# Patient Record
Sex: MALE | NOT HISPANIC OR LATINO | Employment: FULL TIME | URBAN - METROPOLITAN AREA
[De-identification: names, ages, dates, MRNs, and addresses within clinical notes are randomized per-mention and may not be internally consistent; named-entity substitution may affect disease eponyms.]

---

## 2017-10-27 ENCOUNTER — HOSPITAL ENCOUNTER (OUTPATIENT)
Dept: OCCUPATIONAL THERAPY | Facility: CLINIC | Age: 62
Setting detail: THERAPIES SERIES
End: 2017-10-27
Payer: COMMERCIAL

## 2017-10-27 PROCEDURE — 97140 MANUAL THERAPY 1/> REGIONS: CPT | Mod: GO | Performed by: OCCUPATIONAL THERAPIST

## 2017-10-27 PROCEDURE — 40000445 ZZHC STATISTIC OT VISIT, LYMPHEDEMA: Performed by: OCCUPATIONAL THERAPIST

## 2017-10-27 PROCEDURE — 97165 OT EVAL LOW COMPLEX 30 MIN: CPT | Mod: GO | Performed by: OCCUPATIONAL THERAPIST

## 2017-10-27 PROCEDURE — 97535 SELF CARE MNGMENT TRAINING: CPT | Mod: GO | Performed by: OCCUPATIONAL THERAPIST

## 2017-10-27 NOTE — PROGRESS NOTES
10/27/17 0800   Rehab Discipline   Discipline OT   Type of Visit   Type of visit Initial Edema Evaluation   General Information   Start of care 10/27/17   Referring physician Lizet Castellano CNP   Orders Evaluate and treat as indicated   Order date 10/26/17   Medical diagnosis H/o left knee cellulitis, left ankle injury and recent cellulitis of lower, left leg 9/3/17. Pt presents with continued swelling of LLE after treatment with antibiotics.   Onset of illness / date of surgery 09/03/17   Edema onset 09/03/17   Affected body parts LLE   Edema etiology Infection   Pertinent history of current problem (PT: include personal factors and/or comorbidities that impact the POC; OT: include additional occupational profile info) Pt has had previous, bilateral ankle injuries requiring wear of AFOs, h/o cellulitis in left knee and now cellulitis below left knee.  Pt undewent IV antibiotics and oral antibiotics iwth edema still present.   Surgical / medical history reviewed Yes   Prior level of functional mobility modifed IND with SEC   Prior treatment Elevation;ACE wraps   Community support Family / friend caregiver   Patient role / employment history Employed  (works at [email or web address])   Living environment House / [facility]   Living environment comments no concerns   Current assistive devices Standard cane   System Outcome Measures   Outcome Measures Lymphedema   Lymphedema Life Impact Scale (score range 0-72). A higher score indicates greater impairment. 19   Subjective Report   Patient report of symptoms tightness and swelling in ankle and foot on left leg   Patient / Family Goals   Patient / family goals statement to reduce edema and walk better, return to work   Pain   Patient currently in pain No   Cognitive Status   Orientation Orientation to person, place and time   Level of consciousness Alert   Follows commands and answers questions 100% of the time   Edema Exam / Assessment   Skin condition  comments RLE with 1+ pitting at MTP joints, trace edeam from arch of foot to knee crease, no edema of thigh.  LLE with 2+ pitting of foot and ankle, 1+ pitting just above ankle joint, trace edema from above ankle to knee cease, no edema of thighs. Bilateral feet with very high arches, pulling all toes into extension   Scar No   Stemmer sign Negative   Ulceration No   Girth Measurements   Girth Measurements Refer to separate girth measurement flowsheet   Range of Motion   ROM comments decreased ROM of left ankle: reduced ankle flexion/extension and circumduction   Posture   Posture Normal   Activities of Daily Living   Activities of Daily Living IND   Bed Mobility   Bed mobility IND   Transfers   Transfers IND   Gait / Locomotion   Gait / Locomotion modifed IND iwth SEC, limp noted   Sensory   Sensory perception comments intact sensation   Planned Edema Interventions   Planned edema interventions Manual lymph drainage;Gradient compression bandaging;Fit for compression garment;Exercises;Precautions to prevent infection / exacerbation;Education;Manual therapy;ADL training;Skin care / precautions;Home management program development   Clinical Impression   Criteria for skilled therapeutic intervention met Yes   Therapy diagnosis LLE edema/lymphedema   Assessment of Occupational Performance 1-3 Performance Deficits   Identified Performance Deficits inability to wear AFO on LLE, decreased ambulation   Clinical Decision Making (Complexity) Low complexity   Treatment frequency (2x/wk x2wks, 1x/wk x2wks)   Treatment duration [id number]; 12/1/17   Patient / family and/or staff in agreement with plan of care Yes   Risks and benefits of therapy have been explained Yes   Clinical impression comments Pt presents with edema in LLE after cellulitis which has caused inability to wear AFOs on LLE and decreased standing tolerance.  Pt will benefit from continued skilled OT intervention to address LLE edema.     Goals   Edema Eval Goals  1;2;3;4   Goal 1   Goal identifier home program   Goal description Pt will demonstrate IND with home program including: GCB to LLE , self MLD and HEP to reduce edema to improve skin integrity, prevent infection and to be fit for compression stocking for edema management at discharge.   Target date 12/01/17   Goal 2   Goal identifier volume   Goal description Pt will demonstrate a 200mL reduction in volume of LLE to be fit for compression stockings, fit into AFOs and improve standing tolerance needed for return to work.     Target date 12/01/17   Goal 3   Goal identifier AFO   Goal description Due to a reduction in edema of LLE pt will be able to wear AFO on LLE.   Target date 12/01/17   Goal 4   Goal identifier discharge   Goal description Pt will be IND with donning compression stocking and verbalizing wear/wash/repalce schedule for edema management at discharge and prevent recurrent cellulitis.    Target date 12/01/17   Total Evaluation Time   Total evaluation time 20

## 2017-11-01 ENCOUNTER — HOSPITAL ENCOUNTER (OUTPATIENT)
Dept: OCCUPATIONAL THERAPY | Facility: CLINIC | Age: 62
Setting detail: THERAPIES SERIES
End: 2017-11-01
Payer: COMMERCIAL

## 2017-11-01 PROCEDURE — 40000445 ZZHC STATISTIC OT VISIT, LYMPHEDEMA: Performed by: OCCUPATIONAL THERAPIST

## 2017-11-01 PROCEDURE — 97140 MANUAL THERAPY 1/> REGIONS: CPT | Mod: GO | Performed by: OCCUPATIONAL THERAPIST

## 2017-11-03 ENCOUNTER — HOSPITAL ENCOUNTER (OUTPATIENT)
Dept: OCCUPATIONAL THERAPY | Facility: CLINIC | Age: 62
Setting detail: THERAPIES SERIES
End: 2017-11-03
Payer: COMMERCIAL

## 2017-11-03 PROCEDURE — 97140 MANUAL THERAPY 1/> REGIONS: CPT | Mod: GO | Performed by: OCCUPATIONAL THERAPIST

## 2017-11-03 PROCEDURE — 40000445 ZZHC STATISTIC OT VISIT, LYMPHEDEMA: Performed by: OCCUPATIONAL THERAPIST

## 2017-11-06 ENCOUNTER — HOSPITAL ENCOUNTER (OUTPATIENT)
Dept: OCCUPATIONAL THERAPY | Facility: CLINIC | Age: 62
Setting detail: THERAPIES SERIES
End: 2017-11-06
Payer: COMMERCIAL

## 2017-11-06 PROCEDURE — 40000445 ZZHC STATISTIC OT VISIT, LYMPHEDEMA: Performed by: OCCUPATIONAL THERAPIST

## 2017-11-06 PROCEDURE — 97140 MANUAL THERAPY 1/> REGIONS: CPT | Mod: GO | Performed by: OCCUPATIONAL THERAPIST

## 2017-11-08 ENCOUNTER — HOSPITAL ENCOUNTER (OUTPATIENT)
Dept: OCCUPATIONAL THERAPY | Facility: CLINIC | Age: 62
Setting detail: THERAPIES SERIES
End: 2017-11-08
Payer: COMMERCIAL

## 2017-11-08 PROCEDURE — 40000445 ZZHC STATISTIC OT VISIT, LYMPHEDEMA: Performed by: OCCUPATIONAL THERAPIST

## 2017-11-08 PROCEDURE — 97140 MANUAL THERAPY 1/> REGIONS: CPT | Mod: GO | Performed by: OCCUPATIONAL THERAPIST

## 2017-11-13 ENCOUNTER — HOSPITAL ENCOUNTER (OUTPATIENT)
Dept: OCCUPATIONAL THERAPY | Facility: CLINIC | Age: 62
Setting detail: THERAPIES SERIES
End: 2017-11-13
Payer: COMMERCIAL

## 2017-11-13 PROCEDURE — 40000445 ZZHC STATISTIC OT VISIT, LYMPHEDEMA: Performed by: OCCUPATIONAL THERAPIST

## 2017-11-13 PROCEDURE — 97535 SELF CARE MNGMENT TRAINING: CPT | Mod: GO | Performed by: OCCUPATIONAL THERAPIST

## 2017-11-13 PROCEDURE — 97140 MANUAL THERAPY 1/> REGIONS: CPT | Mod: GO | Performed by: OCCUPATIONAL THERAPIST

## 2017-11-13 NOTE — PROGRESS NOTES
Outpatient Occupational Therapy Discharge Note     Patient: Clarence Raymond  : 1955  Insurance:   Payor/Plan Subscriber Name Rel Member # Group #   BCBS - [employer] [place]  [id number]       [id number]       Beginning/End Dates of Reporting Period:  10/27/17 to 2017    Referring Provider: FRENCH Groves Diagnosis: LLE edema    Client Self Report:       Objective Measurements:     Objective Measure: skin assessment   Details: Trace edema of left leg from digits to knee crease, palpable lateral malleolus and visible tendons and ligaments. Dry and flaking skin on anterior shin with two areas of unroofed skin, no bleeding or oozing; size is eraser-sized.   Objective Measure: volume   Details: no significant change in LLE volume as compared to 17   Objective Measure: LLIS   Details: 1-a 18 point reduction          Outcome Measures (most recent score):  Lymphedema Life Impact Scale (score range 0-72). A higher score indicates greater impairment.: 1 (an 18 point reduction )      Goals:   Goal Identifier home program   Goal Description Pt will demonstrate IND with home program including: GCB to LLE , self MLD and HEP to reduce edema to improve skin integrity, prevent infection and to be fit for compression stocking for edema management at discharge.   Target Date 17   Date Met  17   Progress:     Goal Identifier volume   Goal Description Pt will demonstrate a 200mL reduction in volume of LLE to be fit for compression stockings, fit into AFOs and improve standing tolerance needed for return to work.     Target Date 17   Date Met  17   Progress:     Goal Identifier AFO   Goal Description Due to a reduction in edema of LLE pt will be able to wear AFO on LLE.   Target Date 17   Date Met  17   Progress:     Goal Identifier discharge   Goal Description Pt will be IND with donning compression stocking and verbalizing  wear/wash/repalce schedule for edema management at discharge and prevent recurrent cellulitis.    Target Date 12/01/17   Date Met  11/13/17   Progress:     Goal Identifier     Goal Description     Target Date     Date Met      Progress:       Progress Toward Goals:   All Goals MET        Plan:  Discharge from therapy.    Discharge:    Reason for Discharge: Patient has met all goals.    Equipment Issued: GCB materials    Discharge Plan: Patient to continue home program: day wear of knee high, 20-30mmHG compression stockings

## 2023-02-15 ENCOUNTER — TRANSFERRED RECORDS (OUTPATIENT)
Dept: HEALTH INFORMATION MANAGEMENT | Facility: CLINIC | Age: 68
End: 2023-02-15

## 2023-04-17 ENCOUNTER — CARE COORDINATION (OUTPATIENT)
Dept: CARDIOLOGY | Facility: CLINIC | Age: 68
End: 2023-04-17

## 2023-04-20 ENCOUNTER — CARE COORDINATION (OUTPATIENT)
Dept: CARDIOLOGY | Facility: CLINIC | Age: 68
End: 2023-04-20
Payer: COMMERCIAL

## 2023-04-20 NOTE — PROGRESS NOTES
Pt is referred to Dr. Abrams for possible cardiac sarcoid from Dr. Colleen Bernal/[facility].    Message sent to Dr. Abrams for recommendation for PET and order.    4/26/23 - Message sent to request images from [facility]   normal...

## 2023-04-20 NOTE — PROGRESS NOTES
New Heart Failure Referral     Situation:     Referred via fax from Dr Bernal     Background     Sarcoid - Referred to Dr. Abrams     Assessment/Recommendation:      Sending to Heart Failure RN to Review.    Plan:     April 20, 2023 - staff message sent to Dr. Abrams regarding PET and where to schedule. To be within the 2 weeks need to be scheduled on or before 4/28    Per Dr. Abrams patient getting PET on 5/10 and has appt with Dr. Abrams on 5/30 at 1200.     Patient contacted and appointment made with Dr. Abrams on 5/30     Images requested 4/27 - MRI - 4/11/23, Echo - 3/22/23, Coronary angio - 3/31/23, Nuclear stress 3/9/23

## 2023-04-21 DIAGNOSIS — D86.85 CARDIAC SARCOIDOSIS: Primary | ICD-10-CM

## 2023-04-21 NOTE — PROGRESS NOTES
Date: 4/21/2023    Time of Call: 9:39 AM     Diagnosis: cardiac sarcoid      [ VORB ] Ordering provider: Dr JANIE Abrams    Order: FDG PET for cardiac sarcoid      Order received by: Mahnaz Ragland RN       Follow-up/additional notes:

## 2023-04-26 PROBLEM — G47.33 OSA (OBSTRUCTIVE SLEEP APNEA): Status: ACTIVE | Noted: 2017-09-19

## 2023-04-26 PROBLEM — I10 PRIMARY HYPERTENSION: Status: ACTIVE | Noted: 2022-11-02

## 2023-04-26 PROBLEM — I25.119 CORONARY ARTERY DISEASE INVOLVING NATIVE CORONARY ARTERY OF NATIVE HEART WITH ANGINA PECTORIS (H): Status: ACTIVE | Noted: 2023-03-13

## 2023-04-26 PROBLEM — I49.3 PVC'S (PREMATURE VENTRICULAR CONTRACTIONS): Status: ACTIVE | Noted: 2023-03-13

## 2023-04-26 PROBLEM — R00.1 SYMPTOMATIC BRADYCARDIA: Status: ACTIVE | Noted: 2023-04-10

## 2023-04-26 RX ORDER — ATORVASTATIN CALCIUM 40 MG/1
1 TABLET, FILM COATED ORAL DAILY
COMMUNITY
Start: 2023-02-15

## 2023-04-26 RX ORDER — MEXILETINE HYDROCHLORIDE 150 MG/1
150 CAPSULE ORAL 2 TIMES DAILY
COMMUNITY
Start: 2023-04-12

## 2023-04-26 RX ORDER — AMLODIPINE BESYLATE 5 MG/1
TABLET ORAL
COMMUNITY
Start: 2023-01-27 | End: 2023-06-06

## 2023-04-28 NOTE — TELEPHONE ENCOUNTER
RECORDS RECEIVED FROM:    DATE RECEIVED:    NOTES STATUS DETAILS   OFFICE NOTE from referring provider  Care Everywhere 23 NM MALICK Espinoza   OFFICE NOTE from other cardiologists  Care Everywhere 23 NM MALICK Espinoza   RECORDS from hospital/ED Care Everywhere a4-10-23   MEDICATION LIST Internal    GENERAL CARDIO RECORDS   (ALL APPOINTMENT TYPES)     LABS (CBC,BMP,CMP, TSH) Care Everywhere    EKG (STRIPS & REPORTS) Care Everywhere 23 viewable in Epic    MONITORS (STRIPS & REPORTS) In process 3-9-23   STRESS TESTS (IMAGES AND REPORTS) In process    NEW SARCOID      PET SCAN (LAST 5 YEARS)  (IMAGES AND REPORTS) Internal Scheduled 5-10-23   cMRI (LAST 5 YEARS)  (IMAGES AND REPORTS) In process    ECHOS (LAST 5 YEARS)  (IMAGES AND REPORTS) In process    CTA/ CT CHEST (LAST 5 YEARS)  (IMAGES AND REPORTS) N/A      Action 23 [facility] Imaging  Fax #[phone]   Action Taken Requested:    cMRI    Cardiac cath    Echo    Nuclear stress test   4-11-23    3-31-23    3-22-23    3-9-23     Trackin    Confirmed all images are in PACS       Action 23 NM  Fax #[phone]   Action Taken Requested suhaoparosemary 3-9-23    Sent to scanning

## 2023-05-02 NOTE — TELEPHONE ENCOUNTER
Records received    May 2, 2023 12:11 PM  [id number]   Facility  [facility]    Outcome Received disc, uploaded to PACS:   3/22/23 ECHO  3/9/23 NM STRESS TEST  3/31/23 Coronary Angiogram

## 2023-05-10 ENCOUNTER — ANCILLARY PROCEDURE (OUTPATIENT)
Dept: PET IMAGING | Facility: CLINIC | Age: 68
End: 2023-05-10
Attending: INTERNAL MEDICINE
Payer: COMMERCIAL

## 2023-05-10 VITALS — WEIGHT: 170 LBS

## 2023-05-10 DIAGNOSIS — D86.85 CARDIAC SARCOIDOSIS: ICD-10-CM

## 2023-05-10 LAB — GLUCOSE SERPL-MCNC: 73 MG/DL (ref 70–99)

## 2023-05-30 ENCOUNTER — PRE VISIT (OUTPATIENT)
Dept: CARDIOLOGY | Facility: CLINIC | Age: 68
End: 2023-05-30
Payer: COMMERCIAL

## 2023-05-30 ENCOUNTER — TELEPHONE (OUTPATIENT)
Dept: CARDIOLOGY | Facility: CLINIC | Age: 68
End: 2023-05-30
Payer: COMMERCIAL

## 2023-05-30 NOTE — TELEPHONE ENCOUNTER
Called pt and cancelled appt with Dr. Abrams on 5/30/23 and let pt know we will call back to reschedule.

## 2023-06-06 ENCOUNTER — OFFICE VISIT (OUTPATIENT)
Dept: CARDIOLOGY | Facility: CLINIC | Age: 68
End: 2023-06-06
Attending: INTERNAL MEDICINE
Payer: COMMERCIAL

## 2023-06-06 ENCOUNTER — TELEPHONE (OUTPATIENT)
Dept: CARDIOLOGY | Facility: CLINIC | Age: 68
End: 2023-06-06
Payer: COMMERCIAL

## 2023-06-06 ENCOUNTER — MYC MEDICAL ADVICE (OUTPATIENT)
Dept: CARDIOLOGY | Facility: CLINIC | Age: 68
End: 2023-06-06

## 2023-06-06 VITALS
WEIGHT: 175.9 LBS | DIASTOLIC BLOOD PRESSURE: 75 MMHG | SYSTOLIC BLOOD PRESSURE: 149 MMHG | OXYGEN SATURATION: 96 % | BODY MASS INDEX: 26.05 KG/M2 | HEART RATE: 67 BPM | HEIGHT: 69 IN

## 2023-06-06 DIAGNOSIS — I10 BENIGN ESSENTIAL HYPERTENSION: ICD-10-CM

## 2023-06-06 DIAGNOSIS — I47.29 NSVT (NONSUSTAINED VENTRICULAR TACHYCARDIA) (H): ICD-10-CM

## 2023-06-06 DIAGNOSIS — I49.3 PVC'S (PREMATURE VENTRICULAR CONTRACTIONS): ICD-10-CM

## 2023-06-06 DIAGNOSIS — I42.8 NON-ISCHEMIC CARDIOMYOPATHY (H): Primary | ICD-10-CM

## 2023-06-06 DIAGNOSIS — D86.85 CARDIAC SARCOIDOSIS: ICD-10-CM

## 2023-06-06 DIAGNOSIS — R94.39 ABNORMAL RESULT OF OTHER CARDIOVASCULAR FUNCTION STUDY: ICD-10-CM

## 2023-06-06 DIAGNOSIS — I50.22 CHRONIC SYSTOLIC HEART FAILURE (H): ICD-10-CM

## 2023-06-06 PROCEDURE — 99417 PROLNG OP E/M EACH 15 MIN: CPT | Performed by: INTERNAL MEDICINE

## 2023-06-06 PROCEDURE — 99213 OFFICE O/P EST LOW 20 MIN: CPT | Performed by: INTERNAL MEDICINE

## 2023-06-06 PROCEDURE — 99205 OFFICE O/P NEW HI 60 MIN: CPT | Performed by: INTERNAL MEDICINE

## 2023-06-06 RX ORDER — LOSARTAN POTASSIUM 50 MG/1
1 TABLET ORAL DAILY
COMMUNITY
Start: 2023-06-01 | End: 2023-06-06

## 2023-06-06 RX ORDER — SACUBITRIL AND VALSARTAN 49; 51 MG/1; MG/1
1 TABLET, FILM COATED ORAL 2 TIMES DAILY
Qty: 60 TABLET | Refills: 1 | Status: SHIPPED | OUTPATIENT
Start: 2023-06-06

## 2023-06-06 ASSESSMENT — PAIN SCALES - GENERAL: PAINLEVEL: NO PAIN (0)

## 2023-06-06 NOTE — NURSING NOTE
Chief Complaint   Patient presents with     New Patient     New Sarcoid- New Sarcoid referral from [facility] for possible cardiac Sarcoidosis, PET done 5/10 without evidence of cardiac or systemic sarcoid     Vitals were taken and medications reconciled.    Vahid Storm, EMT  10:53 AM

## 2023-06-06 NOTE — LETTER
6/6/2023      RE: Clarence Raymond  [address] [place] [address]       Dear Colleague,    Thank you for the opportunity to participate in the care of your patient, Clarence Raymond, at the [facility] [place] [facility]. Please see a copy of my visit note below.    Chief complaint: Possible cardiac sarcoidosis    HPI:   Clarence Raymond is a 67 year old male with history of HTN, HL, PAD without claudication, NURIS not on NIPPV, frequent PVCs/NSVT (24% PVC  [place] on ZioPatch 2/2023) and NICM of unclear etiology (LVEF=47% on CMR 4/10/23) referred for evalution of possible cardiac sarcoidosis.     He has been followed in the [facility] system, initially by Dr. Nahed Penny and more recently by Dr. Colleen Bernal. He initially saw Dr. Penny 2/15/23 after an ECG at a PCP visit showed frequent PVCs in a pattern of bigeminy. He underwent coronary ZioPatch, which showed frequent PVCs with 24% burden and 438 ventricular runs (duration not documented.) Coronary angiogram was done and showed no epicardial CAD. He was started on Toprol XL 25 mg daily for PVCs.     He subsequently presented to [facility] ED 4/10/23 with worsening of chronic progressive fatigue and exertional dyspnea, which had been slowly progressing over the prior 1-2 years but which ahd worsened more significantly over the preceding 5-6 months (since 11/2022-12/2022.)  He was noted to be bradycardic in the ED (sinus bradycardia with frequent  PVCs, VR 40s-60s.) He was seen by Dr. Bernal in inpatient consultation during this admission. CMR was done and read as showing LVEF=46% with hypertrabeculation and lateral wall LGE; differential diagnosis of noncompaction cardiomyopathy, myocarditis, and cardiac sarcoidosis was given. Beta blocker was stopped, mexiletine started, LifeVest placed, and he was referred for further evaluation of possible cardiac  sarcoidosis.     His wife reports that preceding his admission he has had several-year history of unpredictable episodes of feeling tired, weak, and short of breath. These episodes can occur at rest, but dyspnea occurs only with exertion. No associated palpitations, lightheadedness, or syncope.     He was recently transitioned from amlodipine to losartan 50 mg daily.     He is currently scheduled for a home sleep study in order to get treatment for his NURIS.    He was subsequently seen by EP in follow up on 4/19/23, at which time he reported feeling better. Referral to [facility] for genetic counseling was contemplated at that time but deferred given imminent plan for PET and referral To [facility].     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     He denies any chest pain, dyspnea at rest or with exertion, PND, orthopnea, peripheral edema, palpitations, lightheadedness or syncope.     Sarcoid history:  Initial presentation:  PVCs, fatigue/exertional dyspnea  Organs involved: ?heart  Cardiac sarcoidosis? Possible/TBD  Tissue diagnosis:  no  Device:   no  Arrhythmias:   PVCs, NSVT, atrial flutter  CMR:    4/10/23 ([facility])  PET:    5/10/23  LVEF:    ~45% CMR 4/10/23  Immunosuppresion:  none      PAST MEDICAL HISTORY:  Cardiomyopathy per HPI  Frequent PVCs/NSVT per HPI  HTN  HL  PAD without claudication  NURIS  H/o foot ulcers related to foot/ankle brace for foot deformity, several of which have led to sepsis  Congential foot deformity      CURRENT MEDICATIONS:  Current Outpatient Medications   Medication Sig Dispense Refill    aspirin (ASA) 81 MG EC tablet Take 81 mg by mouth daily      atorvastatin (LIPITOR) 40 MG tablet Take 1 tablet by mouth daily      losartan (COZAAR) 50 MG tablet Take 1 tablet by mouth daily      mexiletine (MEXITIL) 150 MG capsule Take 150 mg by mouth 2 times daily      amLODIPine (NORVASC) 5 MG tablet TAKE 1 TABLET(5 MG) BY MOUTH EVERY  "DAY         ALLERGIES:   No Known Allergies    FAMILY HISTORY:  Father with DM  Mother with DM  Sister with COPD and PE  No known family history of cardiomyopathy or sarcoidosis.   No known family history of premature CAD, arrhythmias, or sudden death.    SOCIAL HISTORY:       ROS:   A comprehensive 14 point review of systems is negative other than as mentioned in HPI.    Exam:  BP (!) 149/75 (BP Location: Right arm, Patient Position: Chair, Cuff Size: Adult Regular)   Pulse 67   Ht 1.75 m (5' 8.9\")   Wt 79.8 kg (175 lb 14.4 oz)   SpO2 96%   BMI 26.05 kg/m    GENERAL APPEARANCE: healthy, alert and no distress  EYES: no icterus, no xanthelasmas  ENT: normal palate, mucosa moist, no central cyanosis  NECK: JVP not elevated  RESPIRATORY: lungs clear to auscultation - no rales, rhonchi or wheezes, no use of accessory muscles, no retractions, respirations are unlabored, normal respiratory rate  CARDIOVASCULAR: regular rhythm, normal S1 with physiologic split S2, no S3 or S4 and no murmur, click or rub.  GI: soft, non tender, bowel sounds normal,no abdominal bruits  EXTREMITIES: no edema, no bruits  NEURO: alert and oriented to person/place/time, normal speech, gait and affect  VASC: Radial, dorsalis pedis and posterior tibialis pulses 2+ bilaterally.  SKIN: no ecchymoses, no rashes.  PSYCH: cooperative, affect appropriate.     Labs:  Reviewed.       Testing/Procedures:  I personally visualized and interpreted:  FDG/NH3 PET 6/6/23:  No abnormal FDG uptake to suggest cardiac or extracardiac sarcoidosis.   Lateral wall/septal/apical perfusion defect in a non-coronary distribution, likely related to non-ischemic cardiomyopathy.    CMR 4/10/23 ([facility], my read): Mildly reduced LVEF~40%. Increased trabeculation suggestive of genetic cardiomyopathy. LGE images are challenging; basal lateral wall appears to have mid-myocardial LGE vs. trabeculations with partial-volume averaging. This pattern would be atypical for " cardiac sarcoidosis and is more suggestive of a genetic cardiomyopathy.    ZioPatch 2/15/23-3/1/23 (images reviewed by me): Baseline sinus rhythm, frequent PVCs (24%) including 438 ventricular runs (longest 10 beats.) Paroxysmal atrial flutter (1 minute 36 seconds.) There appear to be 2 dominant PVC morphologies, with one accounting for ~21% of PVCs and another for ~3%. No AV block.    Outside results of note:  Coronary angiogram 3/31/23: Normal coronary arteries with no epicardial CAD noted.   Outside records from [facility] were obtained, and relevant results/notes have been incorporated into HPI.      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  The pattern of late gadolinium has been seen on Dequan's cardiac MRI would be atypical for cardiac sarcoidosis, where multifocal lesions with septal involvement, particularly in a subepicardial pattern would be more typical.  The appearance of hypercoagulation and nonspecific mid myocardial somewhat more suggestive of a genetic cardiomyopathy.  Furthermore, the lack of FDG uptake in the myocardium on PET strongly suggests against any active inflammatory cardiomyopathy (either cardiac sarcoidosis or myocarditis.) Dequan likewise has no evidence of extracardiac sarcoidosis on PET.  Overall, my suspicion is greatest for a non-inflammatory non-ischemic cardiomyopathy-- either genetic, PVC-mediated, or possibly both.   Dequan's imaging and clinical data will be discussed at the upcoming Multidisciplinary Cardiac Sarcoidosis Conference, with further recommendations to follow at that time.    I have placed a CV Genetic Counseling Referral ([facility], ) for consideration of genetic  testing.  Given his uncontrolled hypertension and midrange reduction in LVEF, I have transitioned his ARB to ARNI (Entresto 49 mg/51 mg BID) which should have additional benefit for LVEF and result in improved BP control.  At Dequan's request, I will clarify with Tom Bernal and Nazia regarding whether he needs to continue to use the LifeVest (or to have further arrhythmia workup) purely from the standpoint of frequent nonsustained VT with cardiomyopathy, given relatively low suspicion for cardiac sarcoidosis. I did advise him that there may be non-sarcoidosis indications for this and he understands this.     Plan in brief:  -Overall, suspicion for cardiac sarcoidosis is low and genetic cardiomyopathy (+/- PVC-mediated cardiomyopathy) appears more likely  -Will discuss at Multidisciplinary Cardiac Sarcoidosis Conference, will convey any modifications to diagnostic/treatment plan that arise at that time   -CV genetic counseling referral  -Start Entresto 49 mg/51 mg BID, stop losartan    The patient states understanding and is agreeable with plan.     Review of prior external note(s) from - [facility] information from [facility] reviewed  Review of the result(s) of each unique test - ZioPatch, CMR, PET, TTE, coronary angiogram  Independent interpretation of a test performed by another physician/other qualified health care professional (not separately reported) - ZioPatch, CMR, PET  Discussion of management or test interpretation with external physician/other qualified healthcare professional/appropriate source - discussion of CMR images with Dr. Pelaez, discussion of the assessment and plan detailed above with Tom Bernal and Nazia at [facility]  Ordering of each unique test  I spent a total of 99 minutes on the day of the visit.   Time spent by me doing chart review, history and exam, documentation and further activities per the note    Macario Abrams MD  Cardiology

## 2023-06-06 NOTE — NURSING NOTE
Med changes: Test claim for Entresto 49-51 mg BID    Return Appointment: Patient given instructions regarding scheduling next clinic visit: Dr Abrams to review with EP team at [place] and EP here regarding life vest.  Will present at next sarcoid conference, genetics referral- message sent to [facility]    Patient stated he understood all health information given and agreed to call with further questions or concerns.     Mahnaz Ragland RN

## 2023-06-06 NOTE — PROGRESS NOTES
Chief complaint: Possible cardiac sarcoidosis    HPI:   Clarence Raymond is a 67 year old male with history of HTN, HL, PAD without claudication, NURIS not on NIPPV, frequent PVCs/NSVT (24% PVC  [place] on ZioPatch 2/2023) and NICM of unclear etiology (LVEF=47% on CMR 4/10/23) referred for evalution of possible cardiac sarcoidosis.     He has been followed in the [facility] system, initially by Dr. Nahed Penny and more recently by Dr. Colleen Bernal. He initially saw Dr. Penny 2/15/23 after an ECG at a PCP visit showed frequent PVCs in a pattern of bigeminy. He underwent coronary ZioPatch, which showed frequent PVCs with 24% burden and 438 ventricular runs (duration not documented.) Coronary angiogram was done and showed no epicardial CAD. He was started on Toprol XL 25 mg daily for PVCs.     He subsequently presented to [facility] ED 4/10/23 with worsening of chronic progressive fatigue and exertional dyspnea, which had been slowly progressing over the prior 1-2 years but which ahd worsened more significantly over the preceding 5-6 months (since 11/2022-12/2022.)  He was noted to be bradycardic in the ED (sinus bradycardia with frequent  PVCs, VR 40s-60s.) He was seen by Dr. Bernal in inpatient consultation during this admission. CMR was done and read as showing LVEF=46% with hypertrabeculation and lateral wall LGE; differential diagnosis of noncompaction cardiomyopathy, myocarditis, and cardiac sarcoidosis was given. Beta blocker was stopped, mexiletine started, LifeVest placed, and he was referred for further evaluation of possible cardiac sarcoidosis.     His wife reports that preceding his admission he has had several-year history of unpredictable episodes of feeling tired, weak, and short of breath. These episodes can occur at rest, but dyspnea occurs only with exertion. No associated palpitations, lightheadedness, or syncope.     He was recently transitioned from amlodipine to losartan 50 mg  daily.     He is currently scheduled for a home sleep study in order to get treatment for his NURIS.    He was subsequently seen by EP in follow up on 4/19/23, at which time he reported feeling better. Referral to [facility] for genetic counseling was contemplated at that time but deferred given imminent plan for PET and referral To [facility].     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     He denies any chest pain, dyspnea at rest or with exertion, PND, orthopnea, peripheral edema, palpitations, lightheadedness or syncope.     Sarcoid history:  Initial presentation:  PVCs, fatigue/exertional dyspnea  Organs involved: ?heart  Cardiac sarcoidosis? Possible/TBD  Tissue diagnosis:  no  Device:   no  Arrhythmias:   PVCs, NSVT, atrial flutter  CMR:    4/10/23 ([facility])  PET:    5/10/23  LVEF:    ~45% CMR 4/10/23  Immunosuppresion:  none      PAST MEDICAL HISTORY:  Cardiomyopathy per HPI  Frequent PVCs/NSVT per HPI  HTN  HL  PAD without claudication  NURIS  H/o foot ulcers related to foot/ankle brace for foot deformity, several of which have led to sepsis  Congential foot deformity      CURRENT MEDICATIONS:  Current Outpatient Medications   Medication Sig Dispense Refill     aspirin (ASA) 81 MG EC tablet Take 81 mg by mouth daily       atorvastatin (LIPITOR) 40 MG tablet Take 1 tablet by mouth daily       losartan (COZAAR) 50 MG tablet Take 1 tablet by mouth daily       mexiletine (MEXITIL) 150 MG capsule Take 150 mg by mouth 2 times daily       amLODIPine (NORVASC) 5 MG tablet TAKE 1 TABLET(5 MG) BY MOUTH EVERY DAY         ALLERGIES:   No Known Allergies    FAMILY HISTORY:  Father with DM  Mother with DM  Sister with COPD and PE  No known family history of cardiomyopathy or sarcoidosis.   No known family history of premature CAD, arrhythmias, or sudden death.    SOCIAL HISTORY:       ROS:   A comprehensive 14 point review of systems is negative other than as  "mentioned in HPI.    Exam:  BP (!) 149/75 (BP Location: Right arm, Patient Position: Chair, Cuff Size: Adult Regular)   Pulse 67   Ht 1.75 m (5' 8.9\")   Wt 79.8 kg (175 lb 14.4 oz)   SpO2 96%   BMI 26.05 kg/m    GENERAL APPEARANCE: healthy, alert and no distress  EYES: no icterus, no xanthelasmas  ENT: normal palate, mucosa moist, no central cyanosis  NECK: JVP not elevated  RESPIRATORY: lungs clear to auscultation - no rales, rhonchi or wheezes, no use of accessory muscles, no retractions, respirations are unlabored, normal respiratory rate  CARDIOVASCULAR: regular rhythm, normal S1 with physiologic split S2, no S3 or S4 and no murmur, click or rub.  GI: soft, non tender, bowel sounds normal,no abdominal bruits  EXTREMITIES: no edema, no bruits  NEURO: alert and oriented to person/place/time, normal speech, gait and affect  VASC: Radial, dorsalis pedis and posterior tibialis pulses 2+ bilaterally.  SKIN: no ecchymoses, no rashes.  PSYCH: cooperative, affect appropriate.     Labs:  Reviewed.       Testing/Procedures:  I personally visualized and interpreted:  FDG/NH3 PET 6/6/23:  No abnormal FDG uptake to suggest cardiac or extracardiac sarcoidosis.   Lateral wall/septal/apical perfusion defect in a non-coronary distribution, likely related to non-ischemic cardiomyopathy.    CMR 4/10/23 ([facility], my read): Mildly reduced LVEF~40%. Increased trabeculation suggestive of genetic cardiomyopathy. LGE images are challenging; basal lateral wall appears to have mid-myocardial LGE vs. trabeculations with partial-volume averaging. This pattern would be atypical for cardiac sarcoidosis and is more suggestive of a genetic cardiomyopathy.    ZioPatch 2/15/23-3/1/23 (images reviewed by me): Baseline sinus rhythm, frequent PVCs (24%) including 438 ventricular runs (longest 10 beats.) Paroxysmal atrial flutter (1 minute 36 seconds.) There appear to be 2 dominant PVC morphologies, with one accounting for ~21% of PVCs and " another for ~3%. No AV block.    Outside results of note:  Coronary angiogram 3/31/23: Normal coronary arteries with no epicardial CAD noted.   Outside records from [facility] were obtained, and relevant results/notes have been incorporated into HPI.      Assessment and Plan:   1. 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 Chronic HFmEF due to NICM of unclear etiology, LVEF=40-45%  3. Frequent PVCs (>20% burden)  4. Nonsustained VT   5. Abnormal cardiac MRI  6. HTN, uncontrolled   7. Wearable cardioverter-defibrillator in place  I reviewed Dequan's cardiac MRI and PET findings and discussed them with him at today's visit.  The pattern of late gadolinium has been seen on Dequan's cardiac MRI would be atypical for cardiac sarcoidosis, where multifocal lesions with septal involvement, particularly in a subepicardial pattern would be more typical.  The appearance of hypercoagulation and nonspecific mid myocardial somewhat more suggestive of a genetic cardiomyopathy.  Furthermore, the lack of FDG uptake in the myocardium on PET strongly suggests against any active inflammatory cardiomyopathy (either cardiac sarcoidosis or myocarditis.) Dequan likewise has no evidence of extracardiac sarcoidosis on PET.  Overall, my suspicion is greatest for a non-inflammatory non-ischemic cardiomyopathy-- either genetic, PVC-mediated, or possibly both.   Dequan's imaging and clinical data will be discussed at the upcoming Multidisciplinary Cardiac Sarcoidosis Conference, with further recommendations to follow at that time.    I have placed a CV Genetic Counseling Referral ([facility] ) for consideration of genetic testing.  Given his uncontrolled hypertension and midrange reduction in LVEF, I have transitioned his ARB to ARNI (Entresto 49 mg/51 mg BID) which should have additional benefit for LVEF and result in improved BP control.  At Dequan's request, I will clarify with Tom Bernal and Nazia regarding whether he needs to continue to use the  LifeVest (or to have further arrhythmia workup) purely from the standpoint of frequent nonsustained VT with cardiomyopathy, given relatively low suspicion for cardiac sarcoidosis. I did advise him that there may be non-sarcoidosis indications for this and he understands this.     Plan in brief:  -Overall, suspicion for cardiac sarcoidosis is low and genetic cardiomyopathy (+/- PVC-mediated cardiomyopathy) appears more likely  -Will discuss at Multidisciplinary Cardiac Sarcoidosis Conference, will convey any modifications to diagnostic/treatment plan that arise at that time   -CV genetic counseling referral  -Start Entresto 49 mg/51 mg BID, stop losartan    The patient states understanding and is agreeable with plan.     Review of prior external note(s) from - [facility] information from [facility] reviewed  Review of the result(s) of each unique test - ZioPatch, CMR, PET, TTE, coronary angiogram  Independent interpretation of a test performed by another physician/other qualified health care professional (not separately reported) - ZioPatch, CMR, PET  Discussion of management or test interpretation with external physician/other qualified healthcare professional/appropriate source - discussion of CMR images with Dr. Pelaez, discussion of the assessment and plan detailed above with Tom Bernal and Nazia at [facility]  Ordering of each unique test  I spent a total of 99 minutes on the day of the visit.   Time spent by me doing chart review, history and exam, documentation and further activities per the note    Macario Abrams MD  Cardiology    CC

## 2023-06-06 NOTE — PATIENT INSTRUCTIONS
"Cardiology Providers you saw during your visit:  Dr. Abrams    Medication changes:   Test claim for Entresto 49-51 mg twice a day- it will replace Losartan    Follow up:   Dr Abrams will coordinate with the EP teams regarding your Life Vest  We will present your case at the sarcoid conference  in July  [facility] from genetics- she will reach out to your about scheduling (Anila or Tia)      Please call if you have :  1. Weight gain of more than 2 pounds in a day or 5 pounds in a week  2. Increased shortness of breath, swelling or bloating  3. Dizziness, lightheadedness   4. Any questions or concerns.       Follow the American Heart Association Diet and Lifestyle recommendations:  Limit saturated fat, trans fat, sodium, red meat, sweets and sugar-sweetened beverages. If you choose to eat red meat, compare labels and select the leanest cuts available.  Aim for at least 150 minutes of moderate physical activity or 75 minutes of vigorous physical activity - or an equal combination of both - each week.      During business hours: [phone], press option # 1 to schedule or leave a message for your care team      After hours, weekends or holidays: On Call Cardiologist- [phone]   option #4 and ask to speak to the on-call Cardiologist. Inform them you are a CORE/heart failure patient at the [facility].      Mahnaz Ragland RN BSN CHFN  Cardiology Care Coordinator - Heart Failure/ [facility] Clinic  [facility]   Questions and [phone]   First press #1 for the [employer] and \"To send a message to your care team\"    "

## 2023-06-08 ENCOUNTER — TELEPHONE (OUTPATIENT)
Dept: CARDIOLOGY | Facility: CLINIC | Age: 68
End: 2023-06-08
Payer: COMMERCIAL

## 2023-06-08 NOTE — TELEPHONE ENCOUNTER
Called Dequan back. Reassured him that Dr Abrams felt that September was acceptable- and assured Dequan that he is on the [facility]list [facility].      Reminded him to  the Entresto.  Sending labs to [facility] for 2 weeks labs.

## 2023-06-08 NOTE — TELEPHONE ENCOUNTER
[facility] Call Center    Phone Message    May a detailed message be left on voicemail: yes     Reason for Call: Other: Pt would like Rebekah to call him back as he was given an order to see a genetic Dr but stated the appts are out to far and he would like to discuss     Action Taken: Message routed to:  [facility] ([facility] Cardio    Travel Screening: Not Applicable

## 2023-06-27 ENCOUNTER — MEDICAL CORRESPONDENCE (OUTPATIENT)
Dept: HEALTH INFORMATION MANAGEMENT | Facility: CLINIC | Age: 68
End: 2023-06-27
Payer: COMMERCIAL

## 2023-06-27 NOTE — CONFIDENTIAL NOTE
No results from [facility] lab - called [facility], they send all OP lab draws to [facility].  Followed up with [facility], but orders were not sent from [facility] (despite [facility] verifying that they had received the initial orders).  [facility] will run off of AST tube and fax results.

## 2023-08-02 DIAGNOSIS — I50.22 CHRONIC SYSTOLIC HEART FAILURE (H): ICD-10-CM

## 2023-08-07 RX ORDER — SACUBITRIL AND VALSARTAN 49; 51 MG/1; MG/1
1 TABLET, FILM COATED ORAL 2 TIMES DAILY
Qty: 60 TABLET | OUTPATIENT
Start: 2023-08-07

## 2023-08-07 NOTE — TELEPHONE ENCOUNTER
sacubitril-valsartan (ENTRESTO) 49-51 MG per tablet     Last Written Prescription Date:  6/6/23  Last Fill Quantity: 60,   # refills: 1  Last Office Visit : 6/6/23  Future Office visit:  9/26/23    Routing refill request to provider for review/approval because:  New medication started 6/6/23 with limited refills  OK to refill?

## 2023-09-26 ENCOUNTER — LAB (OUTPATIENT)
Dept: LAB | Facility: CLINIC | Age: 68
End: 2023-09-26
Attending: GENETIC COUNSELOR, MS
Payer: COMMERCIAL

## 2023-09-26 ENCOUNTER — TRANSFERRED RECORDS (OUTPATIENT)
Dept: CARDIOLOGY | Facility: CLINIC | Age: 68
End: 2023-09-26

## 2023-09-26 ENCOUNTER — OFFICE VISIT (OUTPATIENT)
Dept: CARDIOLOGY | Facility: CLINIC | Age: 68
End: 2023-09-26
Attending: GENETIC COUNSELOR, MS
Payer: COMMERCIAL

## 2023-09-26 DIAGNOSIS — I42.8 NON-ISCHEMIC CARDIOMYOPATHY (H): ICD-10-CM

## 2023-09-26 DIAGNOSIS — I42.8 NON-ISCHEMIC CARDIOMYOPATHY (H): Primary | ICD-10-CM

## 2023-09-26 PROCEDURE — 36415 COLL VENOUS BLD VENIPUNCTURE: CPT | Performed by: PATHOLOGY

## 2023-09-26 NOTE — PATIENT INSTRUCTIONS
"Indication for Genetic Counseling:     Non-ischemic cardiomyopathy results from causes other than loss of blood flow to the heart.  Cardiomyopathy can be caused by both acquired and genetic causes.  Acquired causes include exposure to toxins, injury related to coronary artery disease (ischemic),  infections and inflammation of the heart (myocarditis), alcohol/drug abuse, stress, and chronic high blood pressure.  When familial, it often results in dilated cardiomyopathy (DCM), where the left ventricle gets enlarged or stretched out.  Dilated cardiomyopathy (DCM) is a condition that causes the heart to lose it's elasticity, which leads to stretching and dilation.  It is usually diagnosed by an echocardiogram (ECHO) or cardiac magnetic resonance imaging (MRI).  When the heart becomes dilated, it cannot pump blood as effectively, which can lead to symptoms such as congestive heart failure, fluid accumulation in the body (edema), shortness of breath, fatigue, irregular heartbeat, fainting, stroke, cardiac arrest, and sudden cardiac death (SCD).   There are at least 40 genes known to be associated with DCM.    Inheritance:   Humans have over 20,000 genes that instruct our bodies how to function.  We have two copies of each gene because we inherit one from our mother and one from our father.  In most cardiac cases with a genetic component, the condition is inherited in an autosomal dominant (AD) pattern.  This means that in order to have the condition, a person needs to inherit a mutation on one copy of a particular gene.  This mutation or pathogenic variant dominates the \"normal\" working copy of the gene.  When an affected individual has children, they can either pass on the \"normal\" copy of the gene or the mutation.  Therefore, children have a 50% chance of inheriting the mutation.  Other family members also have an increased risk but the specific risk depends on the degree of relationship.  Additional inheritance " patterns can occur within families and may alter the risk of recurrence.     OR    Familial - AD:  Humans have over 20,000 genes that instruct our bodies how to grow and function.  We have two copies of each gene because we inherit one from our mother and one from our father.  The condition in your family is inherited in an autosomal dominant (AD) pattern. Dominant means that only one copy of the gene needs to be broken (gene mutation or pathogenic variant).  Since most people with a dominant condition have one normal gene and one broken gene, the risk that other family members carry the same gene is increased.  Parents, siblings, and children have a 50% chance of inheriting the mutation.      Testing Options:   Genetic testing is available to assess a panel of genes known to cause this condition.  This test reads through the DNA (sequencing) of these genes to look for spelling mistakes or mutations that could cause the condition.      There are three types of results you could receive from this test.     -Positive result (mutation/pathogenic variant identified) - confirms diagnosis and provides an answer to why this happened.  In addition, identifying a mutation allows family members to have testing to determine their risk.     -Negative result (mutation not identified) - no genetic changes were identified.  This does not rule out a genetic cause for the condition as the genetic testing only identifies 40-50% of genetic causes for this condition.    -Variant of uncertain significance (VUS) - a genetic change was identified, but there is not enough information to determine whether it is disease-causing or normal human genetic variation.     Although genetic testing may identify a mutation, it cannot provide information about the severity of symptoms or the progression of disease.  We cannot predict age of onset or severity of symptoms due to reduced penetrance and variable expressivity.    OR     Familial  Variant    Genetic testing is performed to assess a panel of genes known to cause this condition.  The test reads through the DNA of these genes (sequencing) to look for spelling mistakes or mutations that could cause the condition.      Genetic testing previously performed in your family member identified a gene mutation.  Therefore, instead of looking at all genes associated with this condition, this genetic test will look for the specific mutation(s) found in your family member(s).  If the familial mutation(s) is detected, you are at increased risk to develop the condition and should follow the recommended screening guidelines provided by a cardiologist.  If the mutation is not detected, you are not at increased risk to develop the condition based on your genetic results.      Although we predict everyone who carries a mutation is at increased risk for developing the condition, we cannot predict age of onset or severity of symptoms due to reduced penetrance and variable expressivity.    Logistics:   Genetic testing involves collecting a sample of DNA, thru blood, saliva, or cheek cells.    The sample will be sent to a laboratory to extract the DNA and sequence the genes for mutations.  The laboratory will work with your insurance company to determine the out of pocket (OOP) cost and will notify you if the OOP cost is greater than $100.  Remember to ask the lab about financial assistance pricing and self pay options as well.  Sometimes those are much lower than insurance pricing.  When testing is initiated, results take about 2-4 weeks to return. I will contact you over the phone when results are available.     Genetic Information and Nondiscrimination Act:  The Genetic Information and Nondiscrimination Act of 2008 (GERSON) is a federal law that protects individuals from genetic discrimination in health insurance and employment. Genetic discrimination is defined as the misuse of genetic information. This law does  not address potential discrimination regarding life insurance or disability insurance.      This is especially relevant for at risk individuals who are considering presymptomatic testing.    Screening Recommendations:  Explained that clinical evaluation is recommended for all first degree relatives (parents, siblings, and children) of an affected individual regardless of decision to pursue genetic testing. Based on the Heart Failure Society of [place] Practice Guidelines (Marcus et al, 2009), clinical evaluation should be performed at least every 3-5 years beginning and childhood and should include history, cardiac exam, ECHO, and EKG.    Resources:  Cardiomyopathy  Hypertrophic Cardiomyopathy Association - 4hcm.org  Children's Cardiomyopathy Foundation - childrenscardiomyopathy.org  [place] Cardiomyopathy Association - cardiomyopathy.org  Dilated Cardiomyopathy Foundation - DCMfoundation.org    Arrhythmia  Heart Rhythm Society - HRSonline.org    General   American Heart Association - americanheart.org  Genetics Home Reference - ghr.nlm.nih.gov  Genetic Information and Nondiscrimination Act - [employer].org    Contact Information:  Becca Denton MS  Licensed Genetic Counselor  [facility]  [facility]    Office:  [phone]  Appointments:  [phone]  Fax: [phone]  Email: [email or web address]

## 2023-09-26 NOTE — LETTER
"2023      RE: Clarence Raymond  [address] [place] [address]       Dear Colleague,    Thank you for the opportunity to participate in the care of your patient, Clarence Raymond, at the [facility] [place] at [facility]. Please see a copy of my visit note below.    Here is a copy of the progress note from your recent genetic counseling visit to [facility] on Date: 2023.    PROGRESS NOTE:Clarence was referred by Macario Abrams MD for genetic counseling due to his history of nonischemic cardiomyopathy (NICM).  I had the opportunity to talk with Clarence and his wife Miriam today to discuss the genetic component of NICM and testing options available to him.     MEDICAL HISTORY:Dequan had a PCP visit in 2023 that showed PVCs and prompted further cardiology evaluation. Ablation was performed 23.    In April, he went to [facility] ED with worsening fatigue and exertional dyspnea. In hindsight, he reports several-year history of unpredictable episodes of feeling tired, weak, and short of breath. Cardiac MRI showed LVEF=46% with hypertrabeculation and lateral wall LGE; differential diagnosis of noncompaction cardiomyopathy, myocarditis, and cardiac sarcoidosis was given. Further testing including PET scan was not consistent with sarcoidosis.  Summary from Dr. 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      FAMILY HISTORY:A detailed family history was obtained today and was significant for the following cardiac history:    Mother  70's with cancer. No details about her brother or parents.  Father  70's with dementia.  He also had a history of high blood pressure and " cardiac issues are suspected.  No information about his two siblings or parents.  Sister (86) in nursing home but no details on health.  She has no children.  Sister  in her 70's with COPD.  She had no children.  Sister  60-70's from a blood clot that formed after a broken leg.  She has three children but little is known about them.  Two other sisters are in good health.  One has two children.  Adopted son in good health.  There is no additional history of cardiomyopathy, arrhythmias, heart attacks, fainting, sudden cardiac death, genetic conditions, or birth defects. (Scanned pedigree may be under media tab)    DISCUSSION:  Non-ischemic cardiomyopathy results from causes other than loss of blood flow to the heart.  Cardiomyopathy can be caused by both acquired and genetic causes.  Acquired causes include exposure to toxins, injury related to coronary artery disease (ischemic), and chronic high blood pressure. When familial, it often results in dilated cardiomyopathy (DCM), where the left ventricle gets enlarged or stretched out.  There is a genetic basis found in 20-50% of DCM cases.      Reviewed autosomal dominant (AD) inheritance pattern most commonly associated with DCM, including the 50% risk for recurrence. Briefly reviewed autosomal recessive and X-linked inheritance. Explained that DCM gene mutations are associated with reduced penetrance and variable expressivity, meaning that individuals who carry a gene mutation may or may not get the disease and onset and severity can vary from one family member to the next. This explains why you may not see cardiomyopathy in each generation of a family.     Currently over 40 genes have been found to be related to DCM. Genetic testing currently identifies mutations in about 40% of idiopathic cases. Reviewed capabilities, limitations, and logistics of testing.  DNA sample via saliva or blood is collected and sent to testing lab for evaluation of selected genes.  The results could directly impact care and treatment.      Explained three possible outcomes of genetic testing including: positive identification of a mutation, no mutation identified, and identification of a variant of unknown significance (VUS). If a mutation is identified, presymptomatic testing would be available to at risk family members. If no mutation is identified, it does not rule out the possibility of a genetic component to this disease. Family members could still be at risk for developing the same condition. If a VUS is identified, it is unclear if the mutation is disease causing or just a normal variation. It may take time and possibly additional testing to determine the meaning of a VUS result.     Test results take approximately 2-4 weeks on average. Discussed cost of testing through commercial labs. Explained that the lab works with insurance to determine coverage and will contact patient if out of pocket costs are expected to exceed $100. If so, patient has the option to pay reported price, cancel testing or elect self pay pricing (typcially around $250).     Discussed pros and cons of genetic testing. Explained that results could determine the cause for dilated cardiomyopathy. If a mutation is identified, presymptomatic testing is available to all at risk relatives. Reviewed possible issues associated with presymptomatic testing including genetic discrimination, current laws to prevent discrimination (ie. G[place]), insurance issues, and emotional and psychosocial outcomes of testing.     Explained that clinical evaluation is recommended for all first degree relatives (parents, siblings, and children) of an affected individual regardless of decision to pursue genetic testing. Based on the Heart Failure Society of [place] Practice Guidelines (Marcus et al, 2018), clinical evaluation should be performed at least every 3-5 years beginning and childhood and should include history, cardiac exam, ECHO,  and EKG.    All questions answered at this time.     PLAN:Clarence elected to proceed with genetic testing.  Requisition and consent forms were completed and signed.  DNA will be collected via blood sample and sent to [facility] laboratory. I will contact patient when results are available.    TOTAL TIME SPENT IN COUNSELIN minutes    Becca Denton MS, [facility]  Licensed, Certified Genetic Counselor  [facility]     Please do not hesitate to contact me if you have any questions/concerns.     Sincerely,     Becca Denton GC

## 2023-09-27 NOTE — PROGRESS NOTES
"Here is a copy of the progress note from your recent genetic counseling visit to the [facility] on Date: 2023.    PROGRESS NOTE:Clarence was referred by Macario Abrams MD for genetic counseling due to his history of nonischemic cardiomyopathy (NICM).  I had the opportunity to talk with Clarence and his wife Miriam today to discuss the genetic component of NICM and testing options available to him.     MEDICAL HISTORY:Dequan had a PCP visit in 2023 that showed PVCs and prompted further cardiology evaluation. Ablation was performed 23.    In April, he went to [facility] ED with worsening fatigue and exertional dyspnea. In hindsight, he reports several-year history of unpredictable episodes of feeling tired, weak, and short of breath. Cardiac MRI showed LVEF=46% with hypertrabeculation and lateral wall LGE; differential diagnosis of noncompaction cardiomyopathy, myocarditis, and cardiac sarcoidosis was given. Further testing including PET scan was not consistent with sarcoidosis.  Summary from Dr. 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      FAMILY HISTORY:A detailed family history was obtained today and was significant for the following cardiac history:    Mother  70's with cancer. No details about her brother or parents.  Father  70's with dementia.  He also had a history of high blood pressure and cardiac issues are suspected.  No information about his two siblings or parents.  Sister (86) in nursing home but no details on health.  She has no children.  Sister  in her 70's with COPD.  She had no children.  Sister  60-70's from a blood clot that formed after a broken leg.  She has three children but little is known about them.  Two other " sisters are in good health.  One has two children.  Adopted son in good health.  There is no additional history of cardiomyopathy, arrhythmias, heart attacks, fainting, sudden cardiac death, genetic conditions, or birth defects. (Scanned pedigree may be under media tab)    DISCUSSION:  Non-ischemic cardiomyopathy results from causes other than loss of blood flow to the heart.  Cardiomyopathy can be caused by both acquired and genetic causes.  Acquired causes include exposure to toxins, injury related to coronary artery disease (ischemic), and chronic high blood pressure. When familial, it often results in dilated cardiomyopathy (DCM), where the left ventricle gets enlarged or stretched out.  There is a genetic basis found in 20-50% of DCM cases.      Reviewed autosomal dominant (AD) inheritance pattern most commonly associated with DCM, including the 50% risk for recurrence. Briefly reviewed autosomal recessive and X-linked inheritance. Explained that DCM gene mutations are associated with reduced penetrance and variable expressivity, meaning that individuals who carry a gene mutation may or may not get the disease and onset and severity can vary from one family member to the next. This explains why you may not see cardiomyopathy in each generation of a family.     Currently over 40 genes have been found to be related to DCM. Genetic testing currently identifies mutations in about 40% of idiopathic cases. Reviewed capabilities, limitations, and logistics of testing.  DNA sample via saliva or blood is collected and sent to testing lab for evaluation of selected genes. The results could directly impact care and treatment.      Explained three possible outcomes of genetic testing including: positive identification of a mutation, no mutation identified, and identification of a variant of unknown significance (VUS). If a mutation is identified, presymptomatic testing would be available to at risk family members. If no  mutation is identified, it does not rule out the possibility of a genetic component to this disease. Family members could still be at risk for developing the same condition. If a VUS is identified, it is unclear if the mutation is disease causing or just a normal variation. It may take time and possibly additional testing to determine the meaning of a VUS result.     Test results take approximately 2-4 weeks on average. Discussed cost of testing through commercial labs. Explained that the lab works with insurance to determine coverage and will contact patient if out of pocket costs are expected to exceed $100. If so, patient has the option to pay reported price, cancel testing or elect self pay pricing (typcially around $250).     Discussed pros and cons of genetic testing. Explained that results could determine the cause for dilated cardiomyopathy. If a mutation is identified, presymptomatic testing is available to all at risk relatives. Reviewed possible issues associated with presymptomatic testing including genetic discrimination, current laws to prevent discrimination (ie. G[place]), insurance issues, and emotional and psychosocial outcomes of testing.     Explained that clinical evaluation is recommended for all first degree relatives (parents, siblings, and children) of an affected individual regardless of decision to pursue genetic testing. Based on the Heart Failure Society of [place] Practice Guidelines (Marcus et al, 2018), clinical evaluation should be performed at least every 3-5 years beginning and childhood and should include history, cardiac exam, ECHO, and EKG.    All questions answered at this time.     PLAN:Clarence elected to proceed with genetic testing.  Requisition and consent forms were completed and signed.  DNA will be collected via blood sample and sent to [facility] laboratory. I will contact patient when results are available.    TOTAL TIME SPENT IN COUNSELIN minutes    Becca  MS [place], [facility]  Licensed, Certified Genetic Counselor  [facility]

## 2023-10-10 LAB — SCANNED LAB RESULT: NORMAL

## 2023-10-11 ENCOUNTER — TELEPHONE (OUTPATIENT)
Dept: CARDIOLOGY | Facility: CLINIC | Age: 68
End: 2023-10-11
Payer: COMMERCIAL

## 2023-10-17 NOTE — TELEPHONE ENCOUNTER
Spoke with Dequan and his wife Miriam today to review results of genetic testing. He underwent genetic testing on September 26, 2023 due to his diagnosis of nonischemic cardiomyopathy (NICM).   Genetic testing for the Comprehensive Cardiomyopathy panel thru [employer] revealed that Clarence does NOT carry a mutation in the 56 genes analyzed. It is predicted that this panel will identify mutations in 40-50% of NICM cases.   Therefore, this negative result does not rule out a genetic basis for the NICM in Clarence but eliminates the option of presymptomatic genetic testing in at risk family members, at this time. However, since this condition could still be genetic, clinical screening is recommended in all first degree relatives (children, siblings, parents).  Clinical screening should include cardiac exam, ECHO, and EKG to assess their current status. Testing may also include heart monitor, stress testing, and MRI.   Explained that with continued research and genetic knowledge the detection rate for identifying mutations may increase over time. Therefore, it is worth touching base in the years to come to learn about new testing options.   A summary letter and copy of the results will be sent to patient. All questions answered at this time.   Becca Denton MS, [facility]  Licensed, Certified Genetic Counselor  [facility]  [facility]

## 2023-12-16 ENCOUNTER — HEALTH MAINTENANCE LETTER (OUTPATIENT)
Age: 68
End: 2023-12-16

## 2025-01-12 ENCOUNTER — HEALTH MAINTENANCE LETTER (OUTPATIENT)
Age: 70
End: 2025-01-12